# Patient Record
Sex: FEMALE | Race: WHITE | URBAN - METROPOLITAN AREA
[De-identification: names, ages, dates, MRNs, and addresses within clinical notes are randomized per-mention and may not be internally consistent; named-entity substitution may affect disease eponyms.]

---

## 2017-08-07 ENCOUNTER — OFFICE VISIT (OUTPATIENT)
Dept: FAMILY MEDICINE | Facility: OTHER | Age: 42
End: 2017-08-07
Attending: NURSE PRACTITIONER
Payer: COMMERCIAL

## 2017-08-07 DIAGNOSIS — L30.9 ECZEMA, UNSPECIFIED TYPE: ICD-10-CM

## 2017-08-07 DIAGNOSIS — R10.32 LEFT INGUINAL PAIN: Primary | ICD-10-CM

## 2017-08-07 PROCEDURE — 99213 OFFICE O/P EST LOW 20 MIN: CPT | Performed by: NURSE PRACTITIONER

## 2017-08-07 PROCEDURE — 99212 OFFICE O/P EST SF 10 MIN: CPT

## 2017-08-07 RX ORDER — IBUPROFEN 200 MG
200 TABLET ORAL PRN
COMMUNITY

## 2017-08-07 RX ORDER — TRIAMCINOLONE ACETONIDE 5 MG/G
CREAM TOPICAL
COMMUNITY
Start: 2017-01-09 | End: 2019-09-26

## 2017-08-07 ASSESSMENT — ANXIETY QUESTIONNAIRES
4. TROUBLE RELAXING: NOT AT ALL
IF YOU CHECKED OFF ANY PROBLEMS ON THIS QUESTIONNAIRE, HOW DIFFICULT HAVE THESE PROBLEMS MADE IT FOR YOU TO DO YOUR WORK, TAKE CARE OF THINGS AT HOME, OR GET ALONG WITH OTHER PEOPLE: NOT DIFFICULT AT ALL
7. FEELING AFRAID AS IF SOMETHING AWFUL MIGHT HAPPEN: NOT AT ALL
2. NOT BEING ABLE TO STOP OR CONTROL WORRYING: NOT AT ALL
1. FEELING NERVOUS, ANXIOUS, OR ON EDGE: NOT AT ALL
5. BEING SO RESTLESS THAT IT IS HARD TO SIT STILL: NOT AT ALL
3. WORRYING TOO MUCH ABOUT DIFFERENT THINGS: NOT AT ALL
6. BECOMING EASILY ANNOYED OR IRRITABLE: NOT AT ALL
GAD7 TOTAL SCORE: 0

## 2017-08-07 ASSESSMENT — PATIENT HEALTH QUESTIONNAIRE - PHQ9: SUM OF ALL RESPONSES TO PHQ QUESTIONS 1-9: 2

## 2017-08-07 NOTE — PATIENT INSTRUCTIONS
Groin Strain (Adult)    A groin strain is a stretching or partial tearing of the muscle in the lower abdomen or upper thigh. This may happen because of too much coughing, heavy lifting, or active sports. The pain may last for several days to weeks, depending on how bad the stretch or tear is. It will generally get better with rest, ice, and anti-inflammatory medicines.  A groin strain can lead to a groin hernia. This is also called an inguinal hernia. A hernia is a complete tear of the abdominal muscle. This allows fat or the intestines to bulge out and create a visible bump just above the thigh crease. This is a more serious problem and may need surgery to repair it. When you lie down, the bump should get smaller or disappear completely. If it doesn t, and you are not able to flatten it with your hand, you need medical attention right away.  Home care    Avoid heavy lifting, straining, or any activities that cause groin pain.    You may use over-the-counter pain medicine to control pain, unless another pain medicine was prescribed. If you have chronic liver or kidney disease or ever had a stomach ulcer or GI bleeding, talk with your healthcare provider before using these medicines.  Follow-up care  Follow up with your healthcare provider, or as advised. Make an appointment with your healthcare provider if you develop a bump in the area of the groin strain.  When to seek medical advice  Call your healthcare provider right away if any of these occur:    Increasing pain in the area of the groin strain    Tender bump just above the groin crease that does not flatten when you lie down or press on it    Overall abdominal swelling or pain    Fever of 100.4 F (38 C) or above lasting for 24 to 48 hours    Repeated vomiting    Pain that moves to the lower right abdomen, just below the waistline, or spreads to the back  Date Last Reviewed: 11/19/2015 2000-2017 [employer]. [address] [place] [address], [place] [address]. All rights reserved. This information is not intended as a substitute for professional medical care. Always follow your healthcare professional's instructions.      Rest  Ice  Light stretching - mild yoga stretching  No heavy lifting or straining

## 2017-08-07 NOTE — PROGRESS NOTES
SUBJECTIVE:                                                    Anyi Cotton is a 42 year old female who presents to clinic today for the following health issues:      Leg pain, groin area left side      Duration: started in March during a long road trip on and off since then    Description (location/character/radiation): left groin dull pain    Intensity:  moderate    Accompanying signs and symptoms: sharp pain under right breast, by bra    History (similar episodes/previous evaluation): None    Precipitating or alleviating factors: None    Therapies tried and outcome: Tylenol and asa no help             Problem list and histories reviewed & adjusted, as indicated.  Additional history: as documented    Patient Active Problem List   Diagnosis     Von Willebrand's disease (H)     Infective otitis externa, bilateral     Past Surgical History:   Procedure Laterality Date     CHOLECYSTECTOMY       GYN SURGERY      tubal ligation     SOFT TISSUE SURGERY      cyst removal tail bone       Social History   Substance Use Topics     Smoking status: Current Every Day Smoker     Packs/day: 0.50     Years: 20.00     Smokeless tobacco: Never Used     Alcohol use Yes      Comment: rare     History reviewed. No pertinent family history.      Current Outpatient Prescriptions   Medication Sig Dispense Refill     triamcinolone (KENALOG) 0.5 % cream        ibuprofen (ADVIL/MOTRIN) 200 MG tablet Take 200 mg by mouth       No Known Allergies      Reviewed and updated as needed this visit by clinical staffTobacco  Allergies  Meds  Med Hx  Surg Hx  Fam Hx  Soc Hx      Reviewed and updated as needed this visit by Provider         ROS:  C: NEGATIVE for fever, chills, change in weight  INTEGUMENTARY/SKIN: dry skin to hands, patches on arms and in ears - has tried cortisone in the past   E/M: NEGATIVE for ear, mouth and throat problems  R: NEGATIVE for significant cough or SOB  CV: NEGATIVE for chest pain, palpitations or peripheral  edema  GI: left groin pain  : denies disuria  MUSCULOSKELETAL: left lower rib pain    OBJECTIVE:     There were no vitals taken for this visit.  There is no height or weight on file to calculate BMI.   GENERAL: healthy, alert and no distress  NECK: no adenopathy, no asymmetry, masses, or scars and thyroid normal to palpation  RESP: lungs clear to auscultation - no rales, rhonchi or wheezes  CV: regular rate and rhythm, normal S1 S2, no S3 or S4, no murmur, click or rub, no peripheral edema and peripheral pulses strong  ABDOMEN: soft, nontender, no hepatosplenomegaly, no masses and bowel sounds normal  MS: no edema, peripheral pulses normal and tenderness to palpation left groin area  SKIN: no suspicious lesions or rashes  PSYCH: mentation appears normal, affect normal/bright    Diagnostic Test Results:  US to r/o hernia pending    ASSESSMENT:       PLAN:   ASSESSMENT / PLAN:  (R10.32) Left inguinal pain  (primary encounter diagnosis)  Comment: r/o hernia  Plan:  US Extremity Non Vascular Left     Anyi is encouraged to try and rest, use ice, try some light stretching (yoga stretching) and no heavy lifting. 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 If symptoms do not improve Anyi can consider a venous us to r/o DVT - I do not feel as this would be as she had a negative rafael sign and pain was reproduced though groin muscle. This pain has been off and on since March.             (L30.9) Eczema, unspecified type  Comment:   Plan:  DERMATOLOGY REFERRAL              Follow up if no improvement or worsening symptoms          CYNTHIA Hooker [facility]

## 2017-08-07 NOTE — MR AVS SNAPSHOT
After Visit Summary   8/7/2017    Anyi Cotton    MRN: [id number]           Patient Information     Date Of Birth          1975        Visit Information        Provider Department      8/7/2017 2:50 PM Abbey Nava APRN [facility]        Today's Diagnoses     Left inguinal pain    -  1    Eczema, unspecified type          Care Instructions      Groin Strain (Adult)    A groin strain is a stretching or partial tearing of the muscle in the lower abdomen or upper thigh. This may happen because of too much coughing, heavy lifting, or active sports. The pain may last for several days to weeks, depending on how bad the stretch or tear is. It will generally get better with rest, ice, and anti-inflammatory medicines.  A groin strain can lead to a groin hernia. This is also called an inguinal hernia. A hernia is a complete tear of the abdominal muscle. This allows fat or the intestines to bulge out and create a visible bump just above the thigh crease. This is a more serious problem and may need surgery to repair it. When you lie down, the bump should get smaller or disappear completely. If it doesn t, and you are not able to flatten it with your hand, you need medical attention right away.  Home care    Avoid heavy lifting, straining, or any activities that cause groin pain.    You may use over-the-counter pain medicine to control pain, unless another pain medicine was prescribed. If you have chronic liver or kidney disease or ever had a stomach ulcer or GI bleeding, talk with your healthcare provider before using these medicines.  Follow-up care  Follow up with your healthcare provider, or as advised. Make an appointment with your healthcare provider if you develop a bump in the area of the groin strain.  When to seek medical advice  Call your healthcare provider right away if any of these occur:    Increasing pain in the area of the groin strain    Tender bump just above  the groin crease that does not flatten when you lie down or press on it    Overall abdominal swelling or pain    Fever of 100.4 F (38 C) or above lasting for 24 to 48 hours    Repeated vomiting    Pain that moves to the lower right abdomen, just below the waistline, or spreads to the back  Date Last Reviewed: 11/19/2015 2000-2017 [employer]. [address] [place] [address]. All rights reserved. This information is not intended as a substitute for professional medical care. Always follow your healthcare professional's instructions.      Rest  Ice  Light stretching - mild yoga stretching  No heavy lifting or straining          Follow-ups after your visit        Additional Services     DERMATOLOGY REFERRAL       Your provider has referred you to: [facility] [phone]   [email or web address]    Please be aware that coverage of these services is subject to the terms and limitations of your health insurance plan.  Call member services at your health plan with any benefit or coverage questions.      Please bring the following with you to your appointment:    (1) Any X-Rays, CTs or MRIs which have been performed.  Contact the facility where they were done to arrange for  prior to your scheduled appointment.    (2) List of current medications  (3) This referral request   (4) Any documents/labs given to you for this referral                  Who to contact     If you have questions or need follow up information about today's clinic visit or your schedule please contact [facility] directly at [phone].  Normal or non-critical lab and imaging results will be communicated to you by MyChart, letter or phone within 4 business days after the clinic has received the results. If you do not hear from us within 7 days, please contact the clinic through MyChart or phone. If you have a critical or abnormal lab result, we will notify you by phone as soon  "as possible.  Submit refill requests through [employer] or call your pharmacy and they will forward the refill request to us. Please allow 3 business days for your refill to be completed.          Additional Information About Your Visit        [employer] Information     [employer] lets you send messages to your doctor, view your test results, renew your prescriptions, schedule appointments and more. To sign up, go to [email or web address] . Click on \"Log in\" on the left side of the screen, which will take you to the Welcome page. Then click on \"Sign up Now\" on the right side of the page.     You will be asked to enter the access code listed below, as well as some personal information. Please follow the directions to create your username and password.     Your access code is: [email or web address]  Expires: 2017  3:29 PM     Your access code will  in 90 days. If you need help or a new code, please call your [facility] clinic or [phone].        Care EveryWhere ID     This is your Care EveryWhere ID. This could be used by other organizations to access your [facility] medical records  [id number]         Blood Pressure from Last 3 Encounters:   12/16/15 90/52    Weight from Last 3 Encounters:   12/16/15 182 lb (82.6 kg)              We Performed the Following     DERMATOLOGY REFERRAL          Today's Medication Changes          These changes are accurate as of: 17  3:29 PM.  If you have any questions, ask your nurse or doctor.               Stop taking these medicines if you haven't already. Please contact your care team if you have questions.     Phenyleph-Doxylamine-DM-APAP 10-12.5- MG Pack   Stopped by:  Abbey Nava APRN CNP                    Primary Care Provider    None       No address on file        Equal Access to Services     [facility] : Jone Desai, walindsay eckert, qaybta kaalmada cammie, dustin mccallum. So [facility] [phone].    ATENCIÓN: " Si habla carolin, tiene a watson disposición servicios gratuitos de asistencia lingüística. Lyndsey day [phone].    We comply with applicable federal civil rights laws and [place] laws. We do not discriminate on the basis of race, color, national origin, age, disability sex, sexual orientation or gender identity.            Thank you!     Thank you for choosing [facility]  for your care. Our goal is always to provide you with excellent care. Hearing back from our patients is one way we can continue to improve our services. Please take a few minutes to complete the written survey that you may receive in the mail after your visit with us. Thank you!             Your Updated Medication List - Protect others around you: Learn how to safely use, store and throw away your medicines at www.disposemymeds.org.          This list is accurate as of: 8/7/17  3:29 PM.  Always use your most recent med list.                   Brand Name Dispense Instructions for use Diagnosis    ibuprofen 200 MG tablet    ADVIL/MOTRIN     Take 200 mg by mouth        triamcinolone 0.5 % cream    KENALOG

## 2017-08-07 NOTE — NURSING NOTE
"Chief Complaint   Patient presents with     Musculoskeletal Problem       Initial There were no vitals taken for this visit. Estimated body mass index is 29.38 kg/(m^2) as calculated from the following:    Height as of 12/16/15: 5' 6\" (1.676 m).    Weight as of 12/16/15: 182 lb (82.6 kg).  Medication Reconciliation: complete   Reanna Blair    " Yes

## 2017-08-08 ASSESSMENT — ANXIETY QUESTIONNAIRES: GAD7 TOTAL SCORE: 0

## 2017-08-14 ENCOUNTER — HOSPITAL ENCOUNTER (OUTPATIENT)
Dept: ULTRASOUND IMAGING | Facility: HOSPITAL | Age: 42
Discharge: HOME | End: 2017-08-14
Attending: NURSE PRACTITIONER | Admitting: NURSE PRACTITIONER
Payer: COMMERCIAL

## 2017-08-14 PROCEDURE — 76882 US LMTD JT/FCL EVL NVASC XTR: CPT | Mod: TC,LT

## 2019-09-26 ENCOUNTER — OFFICE VISIT (OUTPATIENT)
Dept: OPHTHALMOLOGY | Facility: CLINIC | Age: 44
End: 2019-09-26
Attending: OPHTHALMOLOGY
Payer: COMMERCIAL

## 2019-09-26 PROCEDURE — 92250 FUNDUS PHOTOGRAPHY W/I&R: CPT | Mod: ZF | Performed by: OPHTHALMOLOGY

## 2019-09-26 PROCEDURE — 92235 FLUORESCEIN ANGRPH MLTIFRAME: CPT | Mod: ZF | Performed by: OPHTHALMOLOGY

## 2019-09-26 PROCEDURE — 92134 CPTRZ OPH DX IMG PST SGM RTA: CPT | Mod: ZF | Performed by: OPHTHALMOLOGY

## 2019-09-26 PROCEDURE — G0463 HOSPITAL OUTPT CLINIC VISIT: HCPCS | Mod: 25,ZF

## 2019-09-26 RX ORDER — NEOMYCIN SULFATE, POLYMYXIN B SULFATE, HYDROCORTISONE 3.5; 10000; 1 MG/ML; [USP'U]/ML; MG/ML
3 SOLUTION/ DROPS AURICULAR (OTIC) 2 TIMES DAILY
COMMUNITY

## 2019-09-26 ASSESSMENT — CONF VISUAL FIELD
OS_INFERIOR_NASAL_RESTRICTION: 2
OD_SUPERIOR_NASAL_RESTRICTION: 2
OD_INFERIOR_NASAL_RESTRICTION: 2
OS_SUPERIOR_TEMPORAL_RESTRICTION: 2
OS_INFERIOR_TEMPORAL_RESTRICTION: 2
OD_SUPERIOR_TEMPORAL_RESTRICTION: 2
OD_INFERIOR_TEMPORAL_RESTRICTION: 2
METHOD: COUNTING FINGERS
OS_SUPERIOR_NASAL_RESTRICTION: 2

## 2019-09-26 ASSESSMENT — REFRACTION_WEARINGRX
OS_CYLINDER: +0.50
OD_CYLINDER: SPHERE
OS_SPHERE: -5.00
OS_AXIS: 068
OD_SPHERE: -3.75
SPECS_TYPE: SVL DIST

## 2019-09-26 ASSESSMENT — TONOMETRY
OS_IOP_MMHG: 20
IOP_METHOD: TONOPEN
OD_IOP_MMHG: 20

## 2019-09-26 ASSESSMENT — VISUAL ACUITY
OD_CC: 20/300
OS_CC: 20/20
OS_CC: J1+
METHOD: SNELLEN - LINEAR
CORRECTION_TYPE: GLASSES

## 2019-09-26 ASSESSMENT — EXTERNAL EXAM - LEFT EYE: OS_EXAM: NORMAL

## 2019-09-26 ASSESSMENT — SLIT LAMP EXAM - LIDS
COMMENTS: NORMAL
COMMENTS: NORMAL

## 2019-09-26 ASSESSMENT — CUP TO DISC RATIO
OS_RATIO: 0.3
OD_RATIO: 0.3

## 2019-09-26 ASSESSMENT — EXTERNAL EXAM - RIGHT EYE: OD_EXAM: NORMAL

## 2019-09-26 NOTE — NURSING NOTE
Chief Complaints and History of Present Illnesses   Patient presents with     Scotoma Evaluation     Chief Complaint(s) and History of Present Illness(es)     Scotoma Evaluation     Laterality: left eye    Onset: 1 month ago    Frequency: constantly    Timing: throughout the day    Context: distance vision, mid-range vision, near vision, reading, watching TV, computer work, driving, night driving, dim lighting and h/o migraines    Course: stable    Associated symptoms: fatigue.  Negative for eye pain, dryness, redness, tearing, headache, abnormal color vision, trauma and previous episodes    Pain scale: 0/10              Comments     Chief Complaints and History of Present Illnesses   Patient presents with     Scotoma Evaluation     Chief Complaint(s) and History of Present Illness(es)     Scotoma Evaluation     Laterality: left eye    Onset: 1 month ago    Frequency: constantly    Timing: throughout the day    Context: distance vision, mid-range vision, near vision, reading, watching TV, computer work, driving, night driving, dim lighting and h/o migraines    Course: stable    Associated symptoms: fatigue.  Negative for eye pain, dryness, redness, tearing, headache, abnormal color vision, trauma and previous episodes    Pain scale: 0/10              Comments     Hx: CNV RE onset about 3 years ago. Two sets of injections, non effective per pt. Left patient with no central vision, just slight peripheral.   Hx: Vitreous syneresis each eye  New scotoma is situated 'off to left of central VF in LE' per pt. Associated superior flashes per pt.  States usually wears SCL with otc readers.  Noelle Van, MITCH COT 8:17 AM 09/26/2019

## 2019-09-26 NOTE — LETTER
"9/26/2019    RE: Anyi Cotton  [address]  [facility] [address]     Dear Jessica,    Thank you for referring your patient, Anyi Cotton, to the EYE CLINIC at [facility]. Please see a copy of my visit note below.    CC: CNV right eye   HPI: Anyi Cotton is a  44 year old year-old patient with history of CNV right eye treated by Dr Kendrick with avastin. At this time, unknown origin. First saw Dr. Kendrick 10/2016 for macular edema right eye s/p Avastin x2 (most recent 2017, right eye). Patient's first symptom in right eye was a new \"blind spot\" which then progressed as stated above.   Patient presents today after noticing a new blind spot in her LEFT eye about 1 month ago, no change.     PMHx:   - Cholecystectomy  - Pilonidal cyst removal   - Von Willebrand's Type 2  - No DM or HTN     Past Ocular History:   - No eye/eyelid surgeries   - Refractive error  - No trauma   - CNV right eye s/p Avastin as above  - FHx: Maternal great Aunt with AMD; Maternal aunt with RD;   - FHx: DM and HTN      Retinal Imaging:  OCT 9-26-19  RE:large central submacular (subretinal/subRPE) scar with likely regressed CNV with overlying IRF; normal choroid thickness  LE:normal macula, normal choroid thickness; attached PHF; through lesion inf arcade with RPE/EZ disruption with hyper-reflective NFL     AF 9-26-19  right eye: large area of macular/submacular hypo-AF with a surrounding ring of hyper AF  left eye: Small area of Hypo-AF at inferior arcade     fluorescein angiography 9-26-19  Right eye: normal choroidal filling; staining of central macular lesion and 2 punched out CR scars along arcades. No late leakage  Left eye: normal choroidal filling; staining of inf arcade CR scar     Assessment & Plan:  1. 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 POHS left eye  - No CNV  - OCT normal   - No macular lesions, thus unlikely to develop macular CNV  - monitor     3. Myelinated NFL left eye   - observe    Follow-up with Dr. Gaucher in 3 months      Again, thank you for allowing me to participate in the care of your patient.      Sincerely,      Leta Srinivasan MD   of Ophthalmology.  Retina Service   Department of Ophthalmology and Visual Neurosciences   [facility]  Phone: [phone]   Fax: [phone]

## 2019-09-26 NOTE — PROGRESS NOTES
"   CC: CNV right eye   HPI: Anyi Cotton is a  44 year old year-old patient with history of CNV right eye treated by Dr Kendrick with avastin. At this time, unknown origin. First saw Dr. Kendrick 10/2016 for macular edema right eye s/p Avastin x2 (most recent 2017, right eye). Patient's first symptom in right eye was a new \"blind spot\" which then progressed as stated above.   Patient presents today after noticing a new blind spot in her LEFT eye about 1 month ago, no change.     PMHx:   - Cholecystectomy  - Pilonidal cyst removal   - Von Willebrand's Type 2  - No DM or HTN     Past Ocular History:   - No eye/eyelid surgeries   - Refractive error  - No trauma   - CNV right eye s/p Avastin as above  - FHx: Maternal great Aunt with AMD; Maternal aunt with RD;   - FHx: DM and HTN      Retinal Imaging:  OCT 9-26-19  RE:large central submacular (subretinal/subRPE) scar with likely regressed CNV with overlying IRF; normal choroid thickness  LE:normal macula, normal choroid thickness; attached PHF; through lesion inf arcade with RPE/EZ disruption with hyper-reflective NFL     AF 9-26-19  right eye: large area of macular/submacular hypo-AF with a surrounding ring of hyper AF  left eye: Small area of Hypo-AF at inferior arcade     fluorescein angiography 9-26-19  Right eye: normal choroidal filling; staining of central macular lesion and 2 punched out CR scars along arcades. No late leakage  Left eye: normal choroidal filling; staining of inf arcade CR scar       Assessment & Plan:  1. 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 POHS left eye  - No CNV  - OCT normal   - No macular lesions, thus unlikely to develop macular CNV  - " monitor     3. Myelinated NFL left eye   - observe    Follow-up with Dr. Gaucher in 3 months    Jose Muir MD  Ophthalmology Resident  PGY-3     ~~~~~~~~~~~~~~~~~~~~~~~~~~~~~~~~~~   Complete documentation of historical and exam elements from today's encounter can be found in the full encounter summary report (not reduplicated in this progress note).  I personally obtained the chief complaint(s) and history of present illness.  I confirmed and edited as necessary the review of systems, past medical/surgical history, family history, social history, and examination findings as documented by others; and I examined the patient myself.  I personally reviewed the relevant tests, images, and reports as documented above.  I personally reviewed the ophthalmic test(s) associated with this encounter, agree with the interpretation(s) as documented by the resident/fellow, and have edited the corresponding report(s) as necessary.   I formulated and edited as necessary the assessment and plan and discussed the findings and management plan with the patient and family    Leta Srinivasan MD   of Ophthalmology.  Retina Service   Department of Ophthalmology and Visual Neurosciences   [facility]  Phone: [phone]   Fax: [phone]